# Patient Record
Sex: FEMALE | Race: WHITE | NOT HISPANIC OR LATINO | Employment: UNEMPLOYED | URBAN - METROPOLITAN AREA
[De-identification: names, ages, dates, MRNs, and addresses within clinical notes are randomized per-mention and may not be internally consistent; named-entity substitution may affect disease eponyms.]

---

## 2017-03-28 ENCOUNTER — LAB VISIT (OUTPATIENT)
Dept: LAB | Facility: HOSPITAL | Age: 22
End: 2017-03-28
Attending: SPECIALIST
Payer: MEDICAID

## 2017-03-28 ENCOUNTER — INITIAL PRENATAL (OUTPATIENT)
Dept: OBSTETRICS AND GYNECOLOGY | Facility: CLINIC | Age: 22
End: 2017-03-28
Payer: MEDICAID

## 2017-03-28 VITALS — DIASTOLIC BLOOD PRESSURE: 62 MMHG | SYSTOLIC BLOOD PRESSURE: 116 MMHG | WEIGHT: 114.19 LBS

## 2017-03-28 DIAGNOSIS — N91.2 ABSENT MENSES: Primary | ICD-10-CM

## 2017-03-28 DIAGNOSIS — Z3A.01 6 WEEKS GESTATION OF PREGNANCY: ICD-10-CM

## 2017-03-28 LAB
ABO + RH BLD: NORMAL
B-HCG UR QL: POSITIVE
BASOPHILS # BLD AUTO: 0.02 K/UL
BASOPHILS NFR BLD: 0.3 %
BLD GP AB SCN CELLS X3 SERPL QL: NORMAL
CTP QC/QA: YES
DIFFERENTIAL METHOD: NORMAL
EOSINOPHIL # BLD AUTO: 0.1 K/UL
EOSINOPHIL NFR BLD: 0.6 %
ERYTHROCYTE [DISTWIDTH] IN BLOOD BY AUTOMATED COUNT: 12.3 %
HCT VFR BLD AUTO: 37.6 %
HGB BLD-MCNC: 12.9 G/DL
LYMPHOCYTES # BLD AUTO: 2 K/UL
LYMPHOCYTES NFR BLD: 25.1 %
MCH RBC QN AUTO: 30.6 PG
MCHC RBC AUTO-ENTMCNC: 34.3 %
MCV RBC AUTO: 89 FL
MONOCYTES # BLD AUTO: 0.9 K/UL
MONOCYTES NFR BLD: 11.5 %
NEUTROPHILS # BLD AUTO: 4.9 K/UL
NEUTROPHILS NFR BLD: 62.4 %
PLATELET # BLD AUTO: 233 K/UL
PMV BLD AUTO: 11.4 FL
RBC # BLD AUTO: 4.22 M/UL
TSH SERPL DL<=0.005 MIU/L-ACNC: 1.54 UIU/ML
WBC # BLD AUTO: 7.77 K/UL

## 2017-03-28 PROCEDURE — 81220 CFTR GENE COM VARIANTS: CPT

## 2017-03-28 PROCEDURE — 87340 HEPATITIS B SURFACE AG IA: CPT

## 2017-03-28 PROCEDURE — 76817 TRANSVAGINAL US OBSTETRIC: CPT | Mod: 26,S$PBB,, | Performed by: SPECIALIST

## 2017-03-28 PROCEDURE — 86703 HIV-1/HIV-2 1 RESULT ANTBDY: CPT

## 2017-03-28 PROCEDURE — 99999 PR PBB SHADOW E&M-NEW PATIENT-LVL III: CPT | Mod: PBBFAC,,, | Performed by: SPECIALIST

## 2017-03-28 PROCEDURE — 84443 ASSAY THYROID STIM HORMONE: CPT

## 2017-03-28 PROCEDURE — 86901 BLOOD TYPING SEROLOGIC RH(D): CPT

## 2017-03-28 PROCEDURE — 99205 OFFICE O/P NEW HI 60 MIN: CPT | Mod: TH,25,S$PBB, | Performed by: SPECIALIST

## 2017-03-28 PROCEDURE — 86900 BLOOD TYPING SEROLOGIC ABO: CPT

## 2017-03-28 PROCEDURE — 86592 SYPHILIS TEST NON-TREP QUAL: CPT

## 2017-03-28 PROCEDURE — 85025 COMPLETE CBC W/AUTO DIFF WBC: CPT

## 2017-03-28 PROCEDURE — 36415 COLL VENOUS BLD VENIPUNCTURE: CPT | Mod: PO

## 2017-03-28 PROCEDURE — 86762 RUBELLA ANTIBODY: CPT

## 2017-03-28 RX ORDER — PROMETHAZINE HYDROCHLORIDE 25 MG/1
25 TABLET ORAL EVERY 4 HOURS
Qty: 30 TABLET | Refills: 1 | Status: SHIPPED | OUTPATIENT
Start: 2017-03-28 | End: 2017-03-29 | Stop reason: SDUPTHER

## 2017-03-28 NOTE — MR AVS SNAPSHOT
[facility] - OB/GYN  [address] [place] [address]  Phone: [phone]                  Viridiana Staton   3/28/2017 10:40 AM   Initial Prenatal    Description:  Female : 1995   Provider:  Giovanni Britt MD   Department:  [facility] - OB/GYN           Reason for Visit     Possible Pregnancy           Diagnoses this Visit        Comments    Absent menses    -  Primary            To Do List           Goals (5 Years of Data)     None      [facility]     [facility] Care [facility] -  Assistance  Registered nurses in the [facility] Center provide clinical advisement, health education, appointment booking, and other advisory services.  Call for this free service at [phone].             Medications           Message regarding Medications     Verify the changes and/or additions to your medication regime listed below are the same as discussed with your clinician today.  If any of these changes or additions are incorrect, please notify your healthcare provider.             Verify that the below list of medications is an accurate representation of the medications you are currently taking.  If none reported, the list may be blank. If incorrect, please contact your healthcare provider. Carry this list with you in case of emergency.           Current Medications     PNV62-FA-om3-dha-epa-fish oil (PRENATAL GUMMY) 400 mcg-35 mg -25 mg-5 mg Chew Take by mouth once daily.           Clinical Reference Information           Prenatal Vitals     Enc. Date GA Prenatal Vitals Prenatal Pulse Pain Level Urine Albumin/Glucose Edema Presentation Dilation/Effacement/Station    3/28/17  116/62 / 51.8 kg (114 lb 3.2 oz)             Your Vitals Were     BP Weight Last Period             116/62 51.8 kg (114 lb 3.2 oz) (LMP Unknown)         Allergies as of 3/28/2017     No Known Allergies      Immunizations Administered on Date of Encounter - 3/28/2017     None      Orders Placed  During Today's Visit      Normal Orders This Visit    Liquid-based pap smear, screening     POCT urine pregnancy       Language Assistance Services     ATTENTION: Language assistance services are available, free of charge. Please call [phone].      ATENCIÓN: Si savannah yadav, tiene a spring disposición servicios gratuitos de asistencia lingüística. Llame al [phone].     [facility] Ý: N?u b?n nói Ti?ng Vi?t, có các d?ch v? h? tr? ngôn ng? mi?n phí dành cho b?n. G?i s? [phone].         [facility] complies with applicable Federal civil rights laws and does not discriminate on the basis of race, color, national origin, age, disability, or sex.

## 2017-03-28 NOTE — PROGRESS NOTES
22 yo WF G1, LMP approx Feb 25 presents for initial prenatal care.  History reviewed. No pertinent past medical history.    Past Surgical History:   Procedure Laterality Date    TONSILLECTOMY, ADENOIDECTOMY  2014       Family History   Problem Relation Age of Onset    Cervical cancer Maternal Grandmother     Cervical cancer Mother        Social History     Social History    Marital status: Single     Spouse name: N/A    Number of children: N/A    Years of education: N/A     Social History Main Topics    Smoking status: Former Smoker    Smokeless tobacco: None    Alcohol use No    Drug use: No    Sexual activity: Yes     Partners: Male     Birth control/ protection: None     Other Topics Concern    None     Social History Narrative    None       Current Outpatient Prescriptions   Medication Sig Dispense Refill    PNV62-FA-om3-dha-epa-fish oil (PRENATAL GUMMY) 400 mcg-35 mg -25 mg-5 mg Chew Take by mouth once daily.       No current facility-administered medications for this visit.        Review of patient's allergies indicates:  No Known Allergies    Review of System:   General: no chills, fever, night sweats, weight gain or weight loss  Psychological: no depression or suicidal ideation  Breasts: no new or changing breast lumps, nipple discharge or masses.  Respiratory: no cough, shortness of breath, or wheezing  Cardiovascular: no chest pain or dyspnea on exertion  Gastrointestinal: no abdominal pain, change in bowel habits, or black or bloody stools  Genito-Urinary: no incontinence, urinary frequency/urgency or vulvar/vaginal symptoms, pelvic pain or abnormal vaginal bleeding.  Musculoskeletal: no gait disturbance or muscular weakness    General Appearance:  Alert, cooperative, no distress, appears stated age   Head:  Normocephalic, without obvious abnormality, atraumatic   Eyes:  PERRL, conjunctiva/corneas clear, EOM's intact, fundi benign, both eyes   Ears:  Normal TM's and external ear canals, both  ears   Nose: Nares normal, septum midline,mucosa normal, no drainage or sinus tenderness   Throat: Lips, mucosa, and tongue normal; teeth and gums normal   Neck: Supple, symmetrical, trachea midline, no adenopathy;  thyroid: not enlarged, symmetric, no tenderness/mass/nodules; no carotid bruit or JVD   Back:   Symmetric, no curvature, ROM normal, no CVA tenderness   Lungs:   Clear to auscultation bilaterally, respirations unlabored   Breasts:  No masses or tenderness   Heart:  Regular rate and rhythm, S1 and S2 normal, no murmur, rub, or gallop   Abdomen:   Soft, non-tender, bowel sounds active all four quadrants,  no masses, no organomegaly    Genitourinary:   External rectal exam shows no thrombosed external hemorrhoids.   Pelvic exam was performed with patient supine.  No labial fusion.  There is no rash, lesion or injury on the right labia.  There is no rash, lesion or injury on the left labia.  No bleeding and no signs of injury around the vaginal introitus, urethra is without lesions and well supported. The cervix is visualized with no discharge, lesions or friability.  No vaginal discharge found.  No significant Cystocele, Enterocele or rectocele, and uterus well supported.  Bimanual exam:  The urethra is normal to palpation and there are no palpable vaginal wall masses.  Uterus is not deviated, not enlarged, not fixed, normal shape and not tender.  Cervix exhibits no motion tenderness.   Right adnexum displays no mass and no tenderness.  Left adnexum displays no mass and no tenderness.   Extremities: Extremities normal, atraumatic, no cyanosis or edema   Pulses: 2+ and symmetric   Skin: Skin color, texture, turgor normal, no rashes or lesions   Lymph nodes: Cervical, supraclavicular, and axillary nodes normal   Neurologic: Normal       Procedure TVS 6.5Mhz probe Pos IUP CRL 6w1 d , viewed by pt                                                          EDC 11/21/17    Prenatal care plan discussed  Books  given  RTO 4 weeks

## 2017-03-29 LAB
C TRACH DNA SPEC QL NAA+PROBE: NOT DETECTED
HBV SURFACE AG SERPL QL IA: NEGATIVE
HIV 1+2 AB+HIV1 P24 AG SERPL QL IA: NEGATIVE
N GONORRHOEA DNA SPEC QL NAA+PROBE: NOT DETECTED
RPR SER QL: NORMAL
RUBV IGG SER-ACNC: 17.2 IU/ML
RUBV IGG SER-IMP: REACTIVE

## 2017-03-29 RX ORDER — PROMETHAZINE HYDROCHLORIDE 25 MG/1
25 TABLET ORAL EVERY 4 HOURS
Qty: 30 TABLET | Refills: 1 | Status: SHIPPED | OUTPATIENT
Start: 2017-03-29 | End: 2017-05-01 | Stop reason: SDUPTHER

## 2017-03-29 NOTE — TELEPHONE ENCOUNTER
----- Message from Anu Fontanez sent at 3/29/2017 12:16 PM CDT -----  Contact: Self-   [phone]  Patient called stating [facility] has not receive the two prescribed rx gummy prenatal vitamins,  medication for nausea. Thanks!

## 2017-04-03 LAB — CFTR MUT ANL BLD/T: NORMAL

## 2017-04-25 ENCOUNTER — ROUTINE PRENATAL (OUTPATIENT)
Dept: OBSTETRICS AND GYNECOLOGY | Facility: CLINIC | Age: 22
End: 2017-04-25
Payer: MEDICAID

## 2017-04-25 VITALS — WEIGHT: 115.94 LBS | DIASTOLIC BLOOD PRESSURE: 70 MMHG | SYSTOLIC BLOOD PRESSURE: 110 MMHG

## 2017-04-25 DIAGNOSIS — Z3A.10 10 WEEKS GESTATION OF PREGNANCY: Primary | ICD-10-CM

## 2017-04-25 PROCEDURE — 99212 OFFICE O/P EST SF 10 MIN: CPT | Mod: PBBFAC,PO | Performed by: SPECIALIST

## 2017-04-25 PROCEDURE — 99999 PR PBB SHADOW E&M-EST. PATIENT-LVL II: CPT | Mod: PBBFAC,,, | Performed by: SPECIALIST

## 2017-04-25 PROCEDURE — 99213 OFFICE O/P EST LOW 20 MIN: CPT | Mod: TH,S$PBB,, | Performed by: SPECIALIST

## 2017-04-25 NOTE — PROGRESS NOTES
Pt returns for continued prenatal care  No overt complaints  I reviewed pt's past medical history, past and current meds, family history, allergies and reviewed problem list    Plan RTO 4 weeks

## 2017-04-25 NOTE — MR AVS SNAPSHOT
[facility] - OB/GYN  [address] [place] [address]  Phone: [phone]                  Viridiana Staton   2017 4:00 PM   Routine Prenatal    Description:  Female : 1995   Provider:  Giovanni Britt MD   Department:  [facility] - OB/[facility]           Reason for Visit     Routine Prenatal Visit                To Do List           Goals (5 Years of Data)     None      [facility]     [facility] Nurse Care Line -  Assistance  Unless otherwise directed by your provider, please contact [facility] On-Call, our nurse care line that is available for  assistance.     Registered nurses in the [facility] On Call Center provide: appointment scheduling, clinical advisement, health education, and other advisory services.  Call: [phone] (toll free)               Medications           Message regarding Medications     Verify the changes and/or additions to your medication regime listed below are the same as discussed with your clinician today.  If any of these changes or additions are incorrect, please notify your healthcare provider.             Verify that the below list of medications is an accurate representation of the medications you are currently taking.  If none reported, the list may be blank. If incorrect, please contact your healthcare provider. Carry this list with you in case of emergency.           Current Medications     PNV62-FA-om3-dha-epa-fish oil (PRENATAL GUMMY) 400 mcg-35 mg -25 mg-5 mg Chew Take 1 each by mouth once daily.    promethazine (PHENERGAN) 25 MG tablet Take 1 tablet (25 mg total) by mouth every 4 (four) hours.           Clinical Reference Information           Prenatal Vitals     Enc. Date GA Prenatal Vitals Prenatal Pulse Pain Level Urine Albumin/Glucose Edema Presentation Dilation/Effacement/Station    17 10w0d 110/70 / 52.6 kg (115 lb 15.4 oz)   0 Negative / Negative       3/28/17 6w0d 116/62 / 51.8 kg (114 lb 3.2 oz)  / 150, u/s    Negative / Negative         Your Vitals Were     BP Weight Last Period             110/70 52.6 kg (115 lb 15.4 oz) (LMP Unknown)         Allergies as of 4/25/2017     No Known Allergies      Immunizations Administered on Date of Encounter - 4/25/2017     None      Language Assistance Services     ATTENTION: Language assistance services are available, free of charge. Please call [phone].      ATENCIÓN: Si habla español, tiene a spring disposición servicios gratuitos de asistencia lingüística. Llame al [phone].     [facility] Ý: N?u b?n nói Ti?ng Vi?t, có các d?ch v? h? tr? ngôn ng? mi?n phí dành cho b?n. G?i s? [phone].         [facility] complies with applicable Federal civil rights laws and does not discriminate on the basis of race, color, national origin, age, disability, or sex.

## 2017-04-29 ENCOUNTER — PATIENT MESSAGE (OUTPATIENT)
Dept: OBSTETRICS AND GYNECOLOGY | Facility: CLINIC | Age: 22
End: 2017-04-29

## 2017-05-01 RX ORDER — PROMETHAZINE HYDROCHLORIDE 25 MG/1
25 TABLET ORAL EVERY 4 HOURS
Qty: 30 TABLET | Refills: 1 | Status: SHIPPED | OUTPATIENT
Start: 2017-05-01 | End: 2017-09-12

## 2017-05-01 NOTE — TELEPHONE ENCOUNTER
Changed pharmacy. Old pharmacy wont forward meds.    Please resend PNV and phenergan to new pharmacy in system

## 2017-05-23 ENCOUNTER — ROUTINE PRENATAL (OUTPATIENT)
Dept: OBSTETRICS AND GYNECOLOGY | Facility: CLINIC | Age: 22
End: 2017-05-23
Payer: MEDICAID

## 2017-05-23 VITALS — WEIGHT: 121.69 LBS | DIASTOLIC BLOOD PRESSURE: 50 MMHG | SYSTOLIC BLOOD PRESSURE: 98 MMHG

## 2017-05-23 DIAGNOSIS — Z3A.17 17 WEEKS GESTATION OF PREGNANCY: Primary | ICD-10-CM

## 2017-05-23 PROCEDURE — 99999 PR PBB SHADOW E&M-EST. PATIENT-LVL II: CPT | Mod: PBBFAC,,, | Performed by: SPECIALIST

## 2017-05-23 PROCEDURE — 99212 OFFICE O/P EST SF 10 MIN: CPT | Mod: PBBFAC,PN | Performed by: SPECIALIST

## 2017-05-23 PROCEDURE — 99213 OFFICE O/P EST LOW 20 MIN: CPT | Mod: TH,S$PBB,, | Performed by: SPECIALIST

## 2017-05-23 NOTE — PROGRESS NOTES
Pt returns for continued prenatal care   Discussed and recommend QUAD screen as well as anatomy u/s on RTO 4 weeks  I reviewed pt's past medical history, past and current meds, family history, allergies and reviewed problem list  Plan RTO 4 weeks

## 2017-06-13 ENCOUNTER — ROUTINE PRENATAL (OUTPATIENT)
Dept: OBSTETRICS AND GYNECOLOGY | Facility: CLINIC | Age: 22
End: 2017-06-13
Payer: MEDICAID

## 2017-06-13 ENCOUNTER — HOSPITAL ENCOUNTER (OUTPATIENT)
Dept: RADIOLOGY | Facility: HOSPITAL | Age: 22
Discharge: HOME | End: 2017-06-13
Attending: SPECIALIST
Payer: MEDICAID

## 2017-06-13 VITALS — WEIGHT: 124.75 LBS | SYSTOLIC BLOOD PRESSURE: 98 MMHG | DIASTOLIC BLOOD PRESSURE: 60 MMHG

## 2017-06-13 DIAGNOSIS — Z3A.17 17 WEEKS GESTATION OF PREGNANCY: ICD-10-CM

## 2017-06-13 DIAGNOSIS — Z3A.17 17 WEEKS GESTATION OF PREGNANCY: Primary | ICD-10-CM

## 2017-06-13 PROCEDURE — 99213 OFFICE O/P EST LOW 20 MIN: CPT | Mod: TH,S$PBB,, | Performed by: SPECIALIST

## 2017-06-13 PROCEDURE — 99999 PR PBB SHADOW E&M-EST. PATIENT-LVL II: CPT | Mod: PBBFAC,,, | Performed by: SPECIALIST

## 2017-06-13 PROCEDURE — 99212 OFFICE O/P EST SF 10 MIN: CPT | Mod: PBBFAC,PN,25 | Performed by: SPECIALIST

## 2017-06-13 PROCEDURE — 76805 OB US >/= 14 WKS SNGL FETUS: CPT | Mod: 26,,, | Performed by: RADIOLOGY

## 2017-06-13 NOTE — PROGRESS NOTES
Pt returns for continued prenatal care. Completed anatomy u/s and will review  Discussed and offered QUAD screen today and pt declined.  Discussed quickening and importance of liberal po fluids  I reviewed pt's past medical history, past and current meds, family history, allergies and reviewed problem list    Plan RTO 4 weeks

## 2017-07-11 ENCOUNTER — ROUTINE PRENATAL (OUTPATIENT)
Dept: OBSTETRICS AND GYNECOLOGY | Facility: CLINIC | Age: 22
End: 2017-07-11
Payer: MEDICAID

## 2017-07-11 VITALS — SYSTOLIC BLOOD PRESSURE: 110 MMHG | WEIGHT: 130.75 LBS | DIASTOLIC BLOOD PRESSURE: 68 MMHG

## 2017-07-11 DIAGNOSIS — Z3A.21 21 WEEKS GESTATION OF PREGNANCY: Primary | ICD-10-CM

## 2017-07-11 LAB
BILIRUB SERPL-MCNC: NORMAL MG/DL
BLOOD URINE, POC: NORMAL
COLOR, POC UA: NORMAL
GLUCOSE UR QL STRIP: NORMAL
KETONES UR QL STRIP: NORMAL
LEUKOCYTE ESTERASE URINE, POC: NORMAL
NITRITE, POC UA: NORMAL
PH, POC UA: 6
PROTEIN, POC: NORMAL
SPECIFIC GRAVITY, POC UA: NORMAL
UROBILINOGEN, POC UA: NORMAL

## 2017-07-11 PROCEDURE — 99213 OFFICE O/P EST LOW 20 MIN: CPT | Mod: PBBFAC,PN | Performed by: SPECIALIST

## 2017-07-11 PROCEDURE — 81002 URINALYSIS NONAUTO W/O SCOPE: CPT | Mod: PBBFAC,PN | Performed by: SPECIALIST

## 2017-07-11 PROCEDURE — 99999 PR PBB SHADOW E&M-EST. PATIENT-LVL III: CPT | Mod: PBBFAC,,, | Performed by: SPECIALIST

## 2017-07-11 PROCEDURE — 99213 OFFICE O/P EST LOW 20 MIN: CPT | Mod: TH,S$PBB,, | Performed by: SPECIALIST

## 2017-07-11 RX ORDER — NITROFURANTOIN (MACROCRYSTALS) 100 MG/1
CAPSULE ORAL
COMMUNITY
Start: 2017-07-07 | End: 2017-09-12

## 2017-07-11 RX ORDER — ONDANSETRON 4 MG/1
TABLET, FILM COATED ORAL
COMMUNITY
Start: 2017-07-07 | End: 2017-10-24

## 2017-07-11 NOTE — PROGRESS NOTES
Pt returns for continued prenatal care   I discussed and stressed importance of liberal po hydration  I reviewed pt's past medical history, past and current meds, family history, allergies and reviewed problem list  RTO 4 weeks

## 2017-07-13 ENCOUNTER — TELEPHONE (OUTPATIENT)
Dept: OBSTETRICS AND GYNECOLOGY | Facility: CLINIC | Age: 22
End: 2017-07-13

## 2017-07-13 NOTE — TELEPHONE ENCOUNTER
----- Message from Marylin Small sent at 7/13/2017 11:11 AM CDT -----  Contact: self  Patient is 21 weeks pregnant and hasn't had a bowel movement in a week, would like to know if it's ok to take a gentle laxative.  Please call back at [phone] (home)

## 2017-08-08 ENCOUNTER — ROUTINE PRENATAL (OUTPATIENT)
Dept: OBSTETRICS AND GYNECOLOGY | Facility: CLINIC | Age: 22
End: 2017-08-08
Payer: MEDICAID

## 2017-08-08 VITALS — SYSTOLIC BLOOD PRESSURE: 116 MMHG | DIASTOLIC BLOOD PRESSURE: 70 MMHG | WEIGHT: 140.44 LBS

## 2017-08-08 DIAGNOSIS — Z3A.25 25 WEEKS GESTATION OF PREGNANCY: Primary | ICD-10-CM

## 2017-08-08 LAB
BILIRUB SERPL-MCNC: NEGATIVE MG/DL
BLOOD URINE, POC: NEGATIVE
COLOR, POC UA: YELLOW
GLUCOSE UR QL STRIP: NORMAL
KETONES UR QL STRIP: NEGATIVE
LEUKOCYTE ESTERASE URINE, POC: NEGATIVE
NITRITE, POC UA: NEGATIVE
PH, POC UA: 5
PROTEIN, POC: NEGATIVE
SPECIFIC GRAVITY, POC UA: NORMAL
UROBILINOGEN, POC UA: NEGATIVE

## 2017-08-08 PROCEDURE — 81002 URINALYSIS NONAUTO W/O SCOPE: CPT | Mod: PBBFAC,PN | Performed by: SPECIALIST

## 2017-08-08 PROCEDURE — 99213 OFFICE O/P EST LOW 20 MIN: CPT | Mod: TH,S$PBB,, | Performed by: SPECIALIST

## 2017-08-08 PROCEDURE — 99212 OFFICE O/P EST SF 10 MIN: CPT | Mod: PBBFAC,PN | Performed by: SPECIALIST

## 2017-08-08 PROCEDURE — 3008F BODY MASS INDEX DOCD: CPT | Mod: ,,, | Performed by: SPECIALIST

## 2017-08-08 PROCEDURE — 99999 PR PBB SHADOW E&M-EST. PATIENT-LVL II: CPT | Mod: PBBFAC,,, | Performed by: SPECIALIST

## 2017-08-08 NOTE — PROGRESS NOTES
Excellent fetal movement  Discussed GD screen on RTO  Discussed daily kick counts  I reviewed pt's past medical history, past and current meds, family history, allergies and reviewed problem list    RTO 3 weeks

## 2017-08-29 ENCOUNTER — LAB VISIT (OUTPATIENT)
Dept: LAB | Facility: HOSPITAL | Age: 22
End: 2017-08-29
Attending: SPECIALIST
Payer: MEDICAID

## 2017-08-29 ENCOUNTER — ROUTINE PRENATAL (OUTPATIENT)
Dept: OBSTETRICS AND GYNECOLOGY | Facility: CLINIC | Age: 22
End: 2017-08-29
Payer: MEDICAID

## 2017-08-29 VITALS — DIASTOLIC BLOOD PRESSURE: 64 MMHG | SYSTOLIC BLOOD PRESSURE: 120 MMHG | WEIGHT: 149.94 LBS

## 2017-08-29 DIAGNOSIS — Z3A.25 25 WEEKS GESTATION OF PREGNANCY: ICD-10-CM

## 2017-08-29 DIAGNOSIS — Z3A.28 28 WEEKS GESTATION OF PREGNANCY: Primary | ICD-10-CM

## 2017-08-29 LAB
BILIRUB SERPL-MCNC: NEGATIVE MG/DL
BLOOD URINE, POC: NEGATIVE
COLOR, POC UA: YELLOW
GLUCOSE SERPL-MCNC: 88 MG/DL
GLUCOSE UR QL STRIP: NORMAL
KETONES UR QL STRIP: NEGATIVE
LEUKOCYTE ESTERASE URINE, POC: NEGATIVE
NITRITE, POC UA: NEGATIVE
PH, POC UA: 5
PROTEIN, POC: NEGATIVE
SPECIFIC GRAVITY, POC UA: NORMAL
UROBILINOGEN, POC UA: NORMAL

## 2017-08-29 PROCEDURE — 99999 PR PBB SHADOW E&M-EST. PATIENT-LVL II: CPT | Mod: PBBFAC,,, | Performed by: SPECIALIST

## 2017-08-29 PROCEDURE — 99213 OFFICE O/P EST LOW 20 MIN: CPT | Mod: TH,S$PBB,, | Performed by: SPECIALIST

## 2017-08-29 PROCEDURE — 99212 OFFICE O/P EST SF 10 MIN: CPT | Mod: PBBFAC,PN | Performed by: SPECIALIST

## 2017-08-29 PROCEDURE — 81002 URINALYSIS NONAUTO W/O SCOPE: CPT | Mod: PBBFAC,PN | Performed by: SPECIALIST

## 2017-08-29 PROCEDURE — 36415 COLL VENOUS BLD VENIPUNCTURE: CPT | Mod: PO

## 2017-08-29 PROCEDURE — 3008F BODY MASS INDEX DOCD: CPT | Mod: ,,, | Performed by: SPECIALIST

## 2017-08-29 PROCEDURE — 82950 GLUCOSE TEST: CPT

## 2017-08-29 NOTE — PROGRESS NOTES
Excellent fetal movement  Completing GD screen today  I reviewed pt's past medical history, past and current meds, family history, allergies and reviewed problem list    Plan Daily kick counts          RTO 2 weeks

## 2017-09-12 ENCOUNTER — ROUTINE PRENATAL (OUTPATIENT)
Dept: OBSTETRICS AND GYNECOLOGY | Facility: CLINIC | Age: 22
End: 2017-09-12
Payer: MEDICAID

## 2017-09-12 VITALS — SYSTOLIC BLOOD PRESSURE: 118 MMHG | WEIGHT: 154.75 LBS | DIASTOLIC BLOOD PRESSURE: 60 MMHG

## 2017-09-12 DIAGNOSIS — Z3A.30 30 WEEKS GESTATION OF PREGNANCY: Primary | ICD-10-CM

## 2017-09-12 LAB
BILIRUB SERPL-MCNC: NEGATIVE MG/DL
BLOOD URINE, POC: NEGATIVE
COLOR, POC UA: YELLOW
GLUCOSE UR QL STRIP: NORMAL
KETONES UR QL STRIP: NEGATIVE
LEUKOCYTE ESTERASE URINE, POC: NEGATIVE
NITRITE, POC UA: NEGATIVE
PH, POC UA: 6
PROTEIN, POC: NEGATIVE
SPECIFIC GRAVITY, POC UA: NORMAL
UROBILINOGEN, POC UA: NORMAL

## 2017-09-12 PROCEDURE — 99999 PR PBB SHADOW E&M-EST. PATIENT-LVL II: CPT | Mod: PBBFAC,,, | Performed by: SPECIALIST

## 2017-09-12 PROCEDURE — 99212 OFFICE O/P EST SF 10 MIN: CPT | Mod: PBBFAC,PN | Performed by: SPECIALIST

## 2017-09-12 PROCEDURE — 3008F BODY MASS INDEX DOCD: CPT | Mod: ,,, | Performed by: SPECIALIST

## 2017-09-12 PROCEDURE — 81002 URINALYSIS NONAUTO W/O SCOPE: CPT | Mod: PBBFAC,PN | Performed by: SPECIALIST

## 2017-09-12 PROCEDURE — 99213 OFFICE O/P EST LOW 20 MIN: CPT | Mod: TH,S$PBB,, | Performed by: SPECIALIST

## 2017-09-12 NOTE — PROGRESS NOTES
Excellent fetal movement  I reviewed pt's past medical history, past and current meds, family history, allergies and reviewed problem list  Plan Daily kick counts          RTO 2 weeks

## 2017-09-26 ENCOUNTER — ROUTINE PRENATAL (OUTPATIENT)
Dept: OBSTETRICS AND GYNECOLOGY | Facility: CLINIC | Age: 22
End: 2017-09-26
Payer: MEDICAID

## 2017-09-26 VITALS — SYSTOLIC BLOOD PRESSURE: 114 MMHG | DIASTOLIC BLOOD PRESSURE: 56 MMHG | WEIGHT: 154.56 LBS

## 2017-09-26 DIAGNOSIS — Z3A.32 32 WEEKS GESTATION OF PREGNANCY: Primary | ICD-10-CM

## 2017-09-26 LAB
BILIRUB SERPL-MCNC: NEGATIVE MG/DL
BLOOD URINE, POC: NEGATIVE
COLOR, POC UA: YELLOW
GLUCOSE UR QL STRIP: NORMAL
KETONES UR QL STRIP: NEGATIVE
LEUKOCYTE ESTERASE URINE, POC: NEGATIVE
NITRITE, POC UA: NEGATIVE
PH, POC UA: 5
PROTEIN, POC: NORMAL
SPECIFIC GRAVITY, POC UA: NORMAL
UROBILINOGEN, POC UA: NORMAL

## 2017-09-26 PROCEDURE — 3008F BODY MASS INDEX DOCD: CPT | Mod: ,,, | Performed by: SPECIALIST

## 2017-09-26 PROCEDURE — 99213 OFFICE O/P EST LOW 20 MIN: CPT | Mod: TH,S$PBB,, | Performed by: SPECIALIST

## 2017-09-26 PROCEDURE — 81002 URINALYSIS NONAUTO W/O SCOPE: CPT | Mod: PBBFAC,PN | Performed by: SPECIALIST

## 2017-09-26 PROCEDURE — 99212 OFFICE O/P EST SF 10 MIN: CPT | Mod: PBBFAC,PN | Performed by: SPECIALIST

## 2017-09-26 PROCEDURE — 99999 PR PBB SHADOW E&M-EST. PATIENT-LVL II: CPT | Mod: PBBFAC,,, | Performed by: SPECIALIST

## 2017-09-26 RX ORDER — CLINDAMYCIN HYDROCHLORIDE 300 MG/1
CAPSULE ORAL
Refills: 0 | COMMUNITY
Start: 2017-09-03 | End: 2017-10-24

## 2017-09-26 NOTE — PROGRESS NOTES
Excellent fetal movement  Discussed and recommend repeat u/s on RTO for fetal growth  Discussed daily kick counts  I reviewed pt's past medical history, past and current meds, family history, allergies and reviewed problem list    RTO 2 weeks

## 2017-10-10 ENCOUNTER — ROUTINE PRENATAL (OUTPATIENT)
Dept: OBSTETRICS AND GYNECOLOGY | Facility: CLINIC | Age: 22
End: 2017-10-10
Payer: MEDICAID

## 2017-10-10 ENCOUNTER — HOSPITAL ENCOUNTER (OUTPATIENT)
Dept: RADIOLOGY | Facility: HOSPITAL | Age: 22
Discharge: HOME | End: 2017-10-10
Attending: SPECIALIST
Payer: MEDICAID

## 2017-10-10 VITALS — WEIGHT: 162.5 LBS | SYSTOLIC BLOOD PRESSURE: 112 MMHG | DIASTOLIC BLOOD PRESSURE: 58 MMHG

## 2017-10-10 DIAGNOSIS — Z3A.34 34 WEEKS GESTATION OF PREGNANCY: Primary | ICD-10-CM

## 2017-10-10 LAB
BILIRUB SERPL-MCNC: NEGATIVE MG/DL
BLOOD URINE, POC: NORMAL
COLOR, POC UA: YELLOW
GLUCOSE UR QL STRIP: 50
KETONES UR QL STRIP: NEGATIVE
LEUKOCYTE ESTERASE URINE, POC: NEGATIVE
NITRITE, POC UA: NORMAL
PH, POC UA: 6
PROTEIN, POC: NORMAL
SPECIFIC GRAVITY, POC UA: NORMAL
UROBILINOGEN, POC UA: NORMAL

## 2017-10-10 PROCEDURE — 99999 PR PBB SHADOW E&M-EST. PATIENT-LVL II: CPT | Mod: PBBFAC,,, | Performed by: SPECIALIST

## 2017-10-10 PROCEDURE — 76816 OB US FOLLOW-UP PER FETUS: CPT | Mod: 26,,, | Performed by: RADIOLOGY

## 2017-10-10 PROCEDURE — 81002 URINALYSIS NONAUTO W/O SCOPE: CPT | Mod: PBBFAC,PN | Performed by: SPECIALIST

## 2017-10-10 PROCEDURE — 76816 OB US FOLLOW-UP PER FETUS: CPT | Mod: TC

## 2017-10-10 PROCEDURE — 99213 OFFICE O/P EST LOW 20 MIN: CPT | Mod: TH,S$PBB,, | Performed by: SPECIALIST

## 2017-10-10 PROCEDURE — 99212 OFFICE O/P EST SF 10 MIN: CPT | Mod: PBBFAC,25,PN | Performed by: SPECIALIST

## 2017-10-10 NOTE — PROGRESS NOTES
Excellent fetal movement   Reviewed and discussed fetal u/s and growth paramaters.  I reviewed pt's past medical history, past and current meds, family history, allergies and reviewed problem list  Plan Daily kick counts          GBS on RTO

## 2017-10-24 ENCOUNTER — ROUTINE PRENATAL (OUTPATIENT)
Dept: OBSTETRICS AND GYNECOLOGY | Facility: CLINIC | Age: 22
End: 2017-10-24
Payer: MEDICAID

## 2017-10-24 VITALS — SYSTOLIC BLOOD PRESSURE: 100 MMHG | DIASTOLIC BLOOD PRESSURE: 60 MMHG | WEIGHT: 164.88 LBS

## 2017-10-24 DIAGNOSIS — Z3A.36 36 WEEKS GESTATION OF PREGNANCY: ICD-10-CM

## 2017-10-24 DIAGNOSIS — Z36.85 ANTENATAL SCREENING FOR STREPTOCOCCUS B: Primary | ICD-10-CM

## 2017-10-24 PROCEDURE — 99999 PR PBB SHADOW E&M-EST. PATIENT-LVL II: CPT | Mod: PBBFAC,,, | Performed by: SPECIALIST

## 2017-10-24 PROCEDURE — 87081 CULTURE SCREEN ONLY: CPT

## 2017-10-24 PROCEDURE — 99212 OFFICE O/P EST SF 10 MIN: CPT | Mod: PBBFAC,PN | Performed by: SPECIALIST

## 2017-10-24 PROCEDURE — 99213 OFFICE O/P EST LOW 20 MIN: CPT | Mod: TH,S$PBB,, | Performed by: SPECIALIST

## 2017-10-24 NOTE — PROGRESS NOTES
Excellent fetal movement  EXAM Cervix LTC vertex  I reviewed pt's past medical history, past and current meds, family history, allergies and reviewed problem list  Plan Daily kick counts          RTO 1 week

## 2017-10-26 LAB — BACTERIA SPEC AEROBE CULT: NORMAL

## 2017-10-31 ENCOUNTER — TELEPHONE (OUTPATIENT)
Dept: OBSTETRICS AND GYNECOLOGY | Facility: CLINIC | Age: 22
End: 2017-10-31

## 2017-10-31 NOTE — TELEPHONE ENCOUNTER
Called to reschedule todays appt. She stated she will wait until her appointment next week to be seen and that everything is going well. Advised to call if anything changes and we would do what we could to work her in.

## 2017-10-31 NOTE — TELEPHONE ENCOUNTER
----- Message from Kenneth Tobin sent at 10/31/2017  2:27 PM CDT -----  Contact: PT  PT missed a call and would the nurse to return their call       Pt can be reached at [phone]

## 2017-11-07 ENCOUNTER — TELEPHONE (OUTPATIENT)
Dept: OBSTETRICS AND GYNECOLOGY | Facility: CLINIC | Age: 22
End: 2017-11-07

## 2017-11-07 NOTE — TELEPHONE ENCOUNTER
----- Message from Crissy Wilson sent at 11/7/2017  9:07 AM CST -----  Contact: Patient  Viridiana, patient [phone] calling because she had an appointment 10/31/17 and was told to reschedule. Patient is 38 weeks pregnant and needs to know when she should come in. Tried to schedule and cannot get her in until 11/21/17. Please advise. Thanks.

## 2017-11-10 PROBLEM — O47.9 IRREGULAR CONTRACTIONS: Status: ACTIVE | Noted: 2017-11-10

## 2017-11-13 PROBLEM — O47.9 BRAXTON HICKS CONTRACTIONS: Status: ACTIVE | Noted: 2017-11-13

## 2017-11-15 PROBLEM — O47.9 BRAXTON HICKS CONTRACTIONS: Status: RESOLVED | Noted: 2017-11-13 | Resolved: 2017-11-15

## 2017-11-15 PROBLEM — O47.9 IRREGULAR CONTRACTIONS: Status: RESOLVED | Noted: 2017-11-10 | Resolved: 2017-11-15

## 2017-11-16 ENCOUNTER — TELEPHONE (OUTPATIENT)
Dept: OBSTETRICS AND GYNECOLOGY | Facility: CLINIC | Age: 22
End: 2017-11-16

## 2017-11-16 NOTE — TELEPHONE ENCOUNTER
----- Message from Mary Luciano sent at 11/16/2017  9:34 AM CST -----  Contact: Self  Patient is requesting appointment access around 12/18 for her 1 mo post delivery checkup.  Please call [phone] (home).  Thanks

## 2017-11-27 ENCOUNTER — POSTPARTUM VISIT (OUTPATIENT)
Dept: OBSTETRICS AND GYNECOLOGY | Facility: CLINIC | Age: 22
End: 2017-11-27
Payer: MEDICAID

## 2017-11-27 VITALS
DIASTOLIC BLOOD PRESSURE: 78 MMHG | BODY MASS INDEX: 23.42 KG/M2 | SYSTOLIC BLOOD PRESSURE: 122 MMHG | WEIGHT: 145.75 LBS | HEIGHT: 66 IN

## 2017-11-27 DIAGNOSIS — M53.3 SACRAL BACK PAIN: Primary | ICD-10-CM

## 2017-11-27 PROCEDURE — 99213 OFFICE O/P EST LOW 20 MIN: CPT | Mod: PBBFAC,PN | Performed by: SPECIALIST

## 2017-11-27 PROCEDURE — 99999 PR PBB SHADOW E&M-EST. PATIENT-LVL III: CPT | Mod: PBBFAC,,, | Performed by: SPECIALIST

## 2017-11-27 PROCEDURE — 99213 OFFICE O/P EST LOW 20 MIN: CPT | Mod: S$PBB,,, | Performed by: SPECIALIST

## 2017-11-27 NOTE — PROGRESS NOTES
21 yo WF 2 weeks s/p  presents for PP evaluation. Pt doing well overall with exception of c/o LBP is sacral area. Pt denies flank pain, f/c, n/v, menorrhagia,hematuria,PP, dysuria, numbness, weakness. PP depression screening negative.    History reviewed. No pertinent past medical history.    Past Surgical History:   Procedure Laterality Date    TONSILLECTOMY, ADENOIDECTOMY         Family History   Problem Relation Age of Onset    Cervical cancer Maternal Grandmother     Cervical cancer Mother        Social History     Social History    Marital status: Single     Spouse name: N/A    Number of children: N/A    Years of education: N/A     Social History Main Topics    Smoking status: Former Smoker    Smokeless tobacco: Never Used    Alcohol use No    Drug use: No    Sexual activity: Yes     Partners: Male     Birth control/ protection: None     Other Topics Concern    None     Social History Narrative    None       Current Outpatient Prescriptions   Medication Sig Dispense Refill    ibuprofen (ADVIL,MOTRIN) 600 MG tablet Take 1 tablet (600 mg total) by mouth every 6 (six) hours as needed for Pain. 20 tablet 3    pediatric multivit-iron-min (FLINTSTONES COMPLETE, IRON,) Chew Take by mouth.       No current facility-administered medications for this visit.        Review of patient's allergies indicates:  No Known Allergies    EXAM  VSS  Chest CTA  Abdomen soft  Point tenderness right SI joint to deep palp.      I discussed likely sacroilitis and rec localized heat and NSAIDS  I will have pt RTO 4 weeks and if no improvement will refer for PT.  Will follow

## 2018-01-11 ENCOUNTER — POSTPARTUM VISIT (OUTPATIENT)
Dept: OBSTETRICS AND GYNECOLOGY | Facility: CLINIC | Age: 23
End: 2018-01-11
Payer: MEDICAID

## 2018-01-11 VITALS — WEIGHT: 144.38 LBS | BODY MASS INDEX: 22.66 KG/M2 | HEIGHT: 67 IN

## 2018-01-11 DIAGNOSIS — Z01.419 GYNECOLOGIC EXAM NORMAL: Primary | ICD-10-CM

## 2018-01-11 PROCEDURE — 99395 PREV VISIT EST AGE 18-39: CPT | Mod: S$PBB,,, | Performed by: SPECIALIST

## 2018-01-11 PROCEDURE — 87624 HPV HI-RISK TYP POOLED RSLT: CPT

## 2018-01-11 PROCEDURE — 99999 PR PBB SHADOW E&M-EST. PATIENT-LVL III: CPT | Mod: PBBFAC,,, | Performed by: SPECIALIST

## 2018-01-11 PROCEDURE — 88175 CYTOPATH C/V AUTO FLUID REDO: CPT | Performed by: PATHOLOGY

## 2018-01-11 PROCEDURE — 99213 OFFICE O/P EST LOW 20 MIN: CPT | Mod: PBBFAC,PN | Performed by: SPECIALIST

## 2018-01-11 PROCEDURE — 88141 CYTOPATH C/V INTERPRET: CPT | Mod: ,,, | Performed by: PATHOLOGY

## 2018-01-11 NOTE — PROGRESS NOTES
23 yo WF s/p  . Pt doing well. No complaints. Discussed contraceptive options and pt desires Mirena IUD placement  No past medical history on file.    Past Surgical History:   Procedure Laterality Date    TONSILLECTOMY, ADENOIDECTOMY         Family History   Problem Relation Age of Onset    Cervical cancer Maternal Grandmother     Cervical cancer Mother        Social History     Social History    Marital status: Single     Spouse name: N/A    Number of children: N/A    Years of education: N/A     Social History Main Topics    Smoking status: Former Smoker    Smokeless tobacco: Never Used    Alcohol use No    Drug use: No    Sexual activity: Yes     Partners: Male     Birth control/ protection: None     Other Topics Concern    Not on file     Social History Narrative    No narrative on file       No current outpatient prescriptions on file.     No current facility-administered medications for this visit.        Review of patient's allergies indicates:  No Known Allergies    Review of System:   General: no chills, fever, night sweats, weight gain or weight loss  Psychological: no depression or suicidal ideation  Breasts: no new or changing breast lumps, nipple discharge or masses.  Respiratory: no cough, shortness of breath, or wheezing  Cardiovascular: no chest pain or dyspnea on exertion  Gastrointestinal: no abdominal pain, change in bowel habits, or black or bloody stools  Genito-Urinary: no incontinence, urinary frequency/urgency or vulvar/vaginal symptoms, pelvic pain or abnormal vaginal bleeding.  Musculoskeletal: no gait disturbance or muscular weakness    General Appearance:  Alert, cooperative, no distress, appears stated age   Head:  Normocephalic, without obvious abnormality, atraumatic   Eyes:  PERRL, conjunctiva/corneas clear, EOM's intact, fundi benign, both eyes   Ears:  Normal TM's and external ear canals, both ears   Nose: Nares normal, septum midline,mucosa normal, no drainage  or sinus tenderness   Throat: Lips, mucosa, and tongue normal; teeth and gums normal   Neck: Supple, symmetrical, trachea midline, no adenopathy;  thyroid: not enlarged, symmetric, no tenderness/mass/nodules; no carotid bruit or JVD   Back:   Symmetric, no curvature, ROM normal, no CVA tenderness   Lungs:   Clear to auscultation bilaterally, respirations unlabored   Breasts:  No masses or tenderness   Heart:  Regular rate and rhythm, S1 and S2 normal, no murmur, rub, or gallop   Abdomen:   Soft, non-tender, bowel sounds active all four quadrants,  no masses, no organomegaly    Genitourinary:   External rectal exam shows no thrombosed external hemorrhoids.   Pelvic exam was performed with patient supine.  No labial fusion.  There is no rash, lesion or injury on the right labia.  There is no rash, lesion or injury on the left labia.  No bleeding and no signs of injury around the vaginal introitus, urethra is without lesions and well supported. The cervix is visualized with no discharge, lesions or friability.  No vaginal discharge found.  No significant Cystocele, Enterocele or rectocele, and uterus well supported.  Bimanual exam:  The urethra is normal to palpation and there are no palpable vaginal wall masses.  Uterus is not deviated, not enlarged, not fixed, normal shape and not tender.  Cervix exhibits no motion tenderness.   Right adnexum displays no mass and no tenderness.  Left adnexum displays no mass and no tenderness.   Extremities: Extremities normal, atraumatic, no cyanosis or edema   Pulses: 2+ and symmetric   Skin: Skin color, texture, turgor normal, no rashes or lesions   Lymph nodes: Cervical, supraclavicular, and axillary nodes normal   Neurologic: Normal         PAP submitted    Plan Will schedule Mirena IUD insertion 2 weeks

## 2018-01-17 LAB
HPV16 AG SPEC QL: NEGATIVE
HPV16+18+H RISK 12 DNA CVX-IMP: POSITIVE
HPV18 DNA SPEC QL NAA+PROBE: NEGATIVE

## 2018-01-25 ENCOUNTER — OFFICE VISIT (OUTPATIENT)
Dept: OBSTETRICS AND GYNECOLOGY | Facility: CLINIC | Age: 23
End: 2018-01-25
Payer: MEDICAID

## 2018-01-25 VITALS — BODY MASS INDEX: 23.13 KG/M2 | HEIGHT: 66 IN | WEIGHT: 143.94 LBS

## 2018-01-25 DIAGNOSIS — R87.612 LGSIL ON PAP SMEAR OF CERVIX: Primary | ICD-10-CM

## 2018-01-25 PROCEDURE — 88305 TISSUE EXAM BY PATHOLOGIST: CPT

## 2018-01-25 PROCEDURE — 88305 TISSUE EXAM BY PATHOLOGIST: CPT | Performed by: PATHOLOGY

## 2018-01-25 PROCEDURE — 57454 BX/CURETT OF CERVIX W/SCOPE: CPT | Mod: S$PBB,,, | Performed by: SPECIALIST

## 2018-01-25 PROCEDURE — 88305 TISSUE EXAM BY PATHOLOGIST: CPT | Mod: 26,59,, | Performed by: PATHOLOGY

## 2018-01-25 PROCEDURE — 57454 BX/CURETT OF CERVIX W/SCOPE: CPT | Mod: PBBFAC,PN | Performed by: SPECIALIST

## 2018-01-25 PROCEDURE — 99212 OFFICE O/P EST SF 10 MIN: CPT | Mod: PBBFAC,PN,25 | Performed by: SPECIALIST

## 2018-01-25 PROCEDURE — 88305 TISSUE EXAM BY PATHOLOGIST: CPT | Mod: 26,,,

## 2018-01-25 PROCEDURE — 99999 PR PBB SHADOW E&M-EST. PATIENT-LVL II: CPT | Mod: PBBFAC,,, | Performed by: SPECIALIST

## 2018-01-25 PROCEDURE — 99214 OFFICE O/P EST MOD 30 MIN: CPT | Mod: 25,S$PBB,, | Performed by: SPECIALIST

## 2018-01-25 NOTE — PROGRESS NOTES
21 yo WF presents for colposcopic exam following recent LGSIL PAP result. Pt was to have Mirena IUD placed however I will defer that until cervical pathology is assessed.    No past medical history on file.    Past Surgical History:   Procedure Laterality Date    TONSILLECTOMY, ADENOIDECTOMY  2014       Family History   Problem Relation Age of Onset    Cervical cancer Maternal Grandmother     Cervical cancer Mother        Social History     Social History    Marital status: Single     Spouse name: N/A    Number of children: N/A    Years of education: N/A     Social History Main Topics    Smoking status: Former Smoker    Smokeless tobacco: Never Used    Alcohol use No    Drug use: No    Sexual activity: Yes     Partners: Male     Birth control/ protection: None     Other Topics Concern    Not on file     Social History Narrative    No narrative on file       No current outpatient prescriptions on file.     No current facility-administered medications for this visit.        Review of patient's allergies indicates:   Allergen Reactions    Amoxil [amoxicillin] Hives       Review of System:   General: no chills, fever, night sweats, weight gain or weight loss  Psychological: no depression or suicidal ideation  Breasts: no new or changing breast lumps, nipple discharge or masses.  Respiratory: no cough, shortness of breath, or wheezing  Cardiovascular: no chest pain or dyspnea on exertion  Gastrointestinal: no abdominal pain, change in bowel habits, or black or bloody stools  Genito-Urinary: no incontinence, urinary frequency/urgency or vulvar/vaginal symptoms, pelvic pain or abnormal vaginal bleeding.  Musculoskeletal: no gait disturbance or muscular weakness      PRE-COLPOSCOPY PROCEDURE COUNSELING:  Discussed the abnormal pap test findings, HPV, need for colposcopy and possible biopsies to determine a diagnosis and plan of care, treatments available, the minimal risks of bleeding and infection with a  colposcopy, alternatives to colposcopy and she agrees to proceed.    Transformation zone---visualized  cervical lesion present---3 oclock  aceto white----3 oclock  punctation-----no  cobblestone----no    Biopsy position--3 oclcock  ECC-----x 1    Specimens placed in formalin and sent to pathology. Monsel's solution was applied at biopsy sites to stop bleeding. The speculum was removed.    POST COLPOSCOPY COUNSELING:   Manage post colposcopy cramping with NSAIDs, Tylenol or Rx per [employer].  Avoid anything in vagina (intercourse, douching, tampons) one week after the procedure.  Expect a clumpy blackish vaginal discharge (Monsel's solution) for several days; Report bleeding heavier than a period, worsening pain, fever > 101.0 F, or foul-smelling vaginal discharge; Stressed importance of follow-up.    Counseling lasted approximately 15 minutes and all her questions were answered.  I will await the above result prior to IUD placement

## 2018-02-05 ENCOUNTER — OFFICE VISIT (OUTPATIENT)
Dept: OBSTETRICS AND GYNECOLOGY | Facility: CLINIC | Age: 23
End: 2018-02-05
Payer: MEDICAID

## 2018-02-05 VITALS
WEIGHT: 145.06 LBS | HEIGHT: 66 IN | BODY MASS INDEX: 23.31 KG/M2 | DIASTOLIC BLOOD PRESSURE: 69 MMHG | SYSTOLIC BLOOD PRESSURE: 110 MMHG

## 2018-02-05 DIAGNOSIS — Z01.812 PRE-PROCEDURE LAB EXAM: Primary | ICD-10-CM

## 2018-02-05 DIAGNOSIS — R87.612 LGSIL ON PAP SMEAR OF CERVIX: ICD-10-CM

## 2018-02-05 LAB
B-HCG UR QL: NEGATIVE
CTP QC/QA: YES

## 2018-02-05 PROCEDURE — 99213 OFFICE O/P EST LOW 20 MIN: CPT | Mod: 25,S$PBB,, | Performed by: SPECIALIST

## 2018-02-05 PROCEDURE — 57511 CRYOCAUTERY OF CERVIX: CPT | Mod: PBBFAC,PN | Performed by: SPECIALIST

## 2018-02-05 PROCEDURE — 81025 URINE PREGNANCY TEST: CPT | Mod: PBBFAC,PN | Performed by: SPECIALIST

## 2018-02-05 PROCEDURE — 99999 PR PBB SHADOW E&M-EST. PATIENT-LVL II: CPT | Mod: PBBFAC,,, | Performed by: SPECIALIST

## 2018-02-05 PROCEDURE — 57511 CRYOCAUTERY OF CERVIX: CPT | Mod: S$PBB,,, | Performed by: SPECIALIST

## 2018-02-05 PROCEDURE — 3008F BODY MASS INDEX DOCD: CPT | Mod: ,,, | Performed by: SPECIALIST

## 2018-02-05 PROCEDURE — 99212 OFFICE O/P EST SF 10 MIN: CPT | Mod: PBBFAC,PN,25 | Performed by: SPECIALIST

## 2018-02-05 NOTE — PROGRESS NOTES
23 yo WF  With LGSIL presents for scheduled cervical cryotherapy. Risks and benefits of procedure discussed  No past medical history on file.    Past Surgical History:   Procedure Laterality Date    TONSILLECTOMY, ADENOIDECTOMY  2014       Family History   Problem Relation Age of Onset    Cervical cancer Maternal Grandmother     Cervical cancer Mother        Social History     Social History    Marital status: Single     Spouse name: N/A    Number of children: N/A    Years of education: N/A     Social History Main Topics    Smoking status: Former Smoker    Smokeless tobacco: Never Used    Alcohol use No    Drug use: No    Sexual activity: Yes     Partners: Male     Birth control/ protection: None     Other Topics Concern    Not on file     Social History Narrative    No narrative on file       No current outpatient prescriptions on file.     No current facility-administered medications for this visit.        Review of patient's allergies indicates:   Allergen Reactions    Amoxil [amoxicillin] Hives       Review of System:   General: no chills, fever, night sweats, weight gain or weight loss  Psychological: no depression or suicidal ideation  Breasts: no new or changing breast lumps, nipple discharge or masses.  Respiratory: no cough, shortness of breath, or wheezing  Cardiovascular: no chest pain or dyspnea on exertion  Gastrointestinal: no abdominal pain, change in bowel habits, or black or bloody stools  Genito-Urinary: no incontinence, urinary frequency/urgency or vulvar/vaginal symptoms, pelvic pain or abnormal vaginal bleeding.  Musculoskeletal: no gait disturbance or muscular weakness    UPT Neg    Procedure Cervical cryocautery  After informed consent, speculum applied and cervix clearly visualized. TZ visiable.  Small cryobell chosen and applied to cervix. 3-5-3 min cycle applied and completed .  Well tolerated.    Plan Pelvic rest x 2 weeks           RTO 2weeks, and IUD placement

## 2018-02-26 ENCOUNTER — OFFICE VISIT (OUTPATIENT)
Dept: OBSTETRICS AND GYNECOLOGY | Facility: CLINIC | Age: 23
End: 2018-02-26
Payer: MEDICAID

## 2018-02-26 VITALS
HEIGHT: 66 IN | SYSTOLIC BLOOD PRESSURE: 116 MMHG | DIASTOLIC BLOOD PRESSURE: 84 MMHG | BODY MASS INDEX: 22.39 KG/M2 | WEIGHT: 139.31 LBS

## 2018-02-26 DIAGNOSIS — Z30.09 ENCOUNTER FOR OTHER GENERAL COUNSELING OR ADVICE ON CONTRACEPTION: Primary | ICD-10-CM

## 2018-02-26 PROCEDURE — 99999 PR PBB SHADOW E&M-EST. PATIENT-LVL III: CPT | Mod: PBBFAC,,, | Performed by: SPECIALIST

## 2018-02-26 PROCEDURE — 99213 OFFICE O/P EST LOW 20 MIN: CPT | Mod: PBBFAC,PN | Performed by: SPECIALIST

## 2018-02-26 PROCEDURE — 99213 OFFICE O/P EST LOW 20 MIN: CPT | Mod: S$PBB,,, | Performed by: SPECIALIST

## 2018-02-26 PROCEDURE — 3008F BODY MASS INDEX DOCD: CPT | Mod: ,,, | Performed by: SPECIALIST

## 2018-02-26 NOTE — PROGRESS NOTES
23 yo WF presents for contraceptive management discussion. Pt was scheduled to have Mirena IUD placed but pt changed her mind and now desires alternative.  I discussed the risks and benefits of OCPs, DPMA, Nexplanon placement, patch and Nuvaring.  After discussion, pt desires DPMA, which she has used in the past. Pt is moving to [place] and will follow up there after establishing with MD.  History reviewed. No pertinent past medical history.    Past Surgical History:   Procedure Laterality Date    TONSILLECTOMY, ADENOIDECTOMY  2014       Family History   Problem Relation Age of Onset    Cervical cancer Maternal Grandmother     Cervical cancer Mother        Social History     Social History    Marital status: Single     Spouse name: N/A    Number of children: N/A    Years of education: N/A     Social History Main Topics    Smoking status: Former Smoker    Smokeless tobacco: Never Used    Alcohol use No    Drug use: No    Sexual activity: Yes     Partners: Male     Birth control/ protection: None     Other Topics Concern    None     Social History Narrative    None       No current outpatient prescriptions on file.     No current facility-administered medications for this visit.        Review of patient's allergies indicates:   Allergen Reactions    Amoxil [amoxicillin] Hives       Review of System:   General: no chills, fever, night sweats, weight gain or weight loss  Psychological: no depression or suicidal ideation  Breasts: no new or changing breast lumps, nipple discharge or masses.  Respiratory: no cough, shortness of breath, or wheezing  Cardiovascular: no chest pain or dyspnea on exertion  Gastrointestinal: no abdominal pain, change in bowel habits, or black or bloody stools  Genito-Urinary: no incontinence, urinary frequency/urgency or vulvar/vaginal symptoms, pelvic pain or abnormal vaginal bleeding.  Musculoskeletal: no gait disturbance or muscular weakness    Plan Follow up [place] [facility]